# Patient Record
Sex: FEMALE | Race: WHITE | NOT HISPANIC OR LATINO | Employment: OTHER | URBAN - METROPOLITAN AREA
[De-identification: names, ages, dates, MRNs, and addresses within clinical notes are randomized per-mention and may not be internally consistent; named-entity substitution may affect disease eponyms.]

---

## 2017-10-17 NOTE — PATIENT DISCUSSION
Recommended pres free artificial tears to use q2h while awake.  Durezol bid for 5 days, QD for 5 days.

## 2018-09-02 NOTE — PATIENT DISCUSSION
MACULAR DRUSEN, OU:  PRESCRIBE AREDS 2 VITAMINS AND INCREASE UV PROTECTION. STRESS GOOD DIET AND NO SMOKING. RETURN FOR FOLLOW-UP AS SCHEDULED. no

## 2019-09-13 NOTE — PATIENT DISCUSSION
Discussed the use of warm compresses BID with focal lid massage.  Add Maxitrol channing QHS OS into cul de sac.  ed erythema will SLOWLY resolve.  IF NI at a week see KK for injection vs I/D.

## 2019-11-20 NOTE — PATIENT DISCUSSION
Anatomically Narrow Angle Counseling: I have explained to the patient at length regarding the diagnosis of narrow angles and the pathophysiology. I have discussed the treatment option of  a Yag laser iridotomy if needed. I have reviewed the option of glaucoma drops with the patient. Patient instructed to return for follow-up as scheduled.

## 2019-11-20 NOTE — PATIENT DISCUSSION
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 GONIO TO BE DONE AT NEXT ANNUAL TO DOCUMENT ANGLES OU. CONTINUE TO FOLLOW YEARLY.

## 2020-12-04 ENCOUNTER — NEW PATIENT (OUTPATIENT)
Dept: URBAN - METROPOLITAN AREA CLINIC 26 | Facility: CLINIC | Age: 80
End: 2020-12-04

## 2020-12-04 VITALS
HEIGHT: 65 IN | DIASTOLIC BLOOD PRESSURE: 70 MMHG | BODY MASS INDEX: 26.66 KG/M2 | WEIGHT: 160 LBS | HEART RATE: 46 BPM | SYSTOLIC BLOOD PRESSURE: 113 MMHG

## 2020-12-04 DIAGNOSIS — H43.393: ICD-10-CM

## 2020-12-04 DIAGNOSIS — H35.361: ICD-10-CM

## 2020-12-04 DIAGNOSIS — H43.822: ICD-10-CM

## 2020-12-04 DIAGNOSIS — H43.392: ICD-10-CM

## 2020-12-04 DIAGNOSIS — H35.372: ICD-10-CM

## 2020-12-04 DIAGNOSIS — H35.342: ICD-10-CM

## 2020-12-04 PROCEDURE — 99204 OFFICE O/P NEW MOD 45 MIN: CPT

## 2020-12-04 PROCEDURE — 92250 FUNDUS PHOTOGRAPHY W/I&R: CPT

## 2020-12-04 ASSESSMENT — VISUAL ACUITY
OD_SC: 20/50-2
OS_CC: 20/60-2
OS_SC: 20/200
OD_CC: 20/25+1

## 2020-12-04 ASSESSMENT — TONOMETRY
OD_IOP_MMHG: 11
OS_IOP_MMHG: 10

## 2021-01-12 ENCOUNTER — FOLLOW UP (OUTPATIENT)
Dept: URBAN - METROPOLITAN AREA CLINIC 26 | Facility: CLINIC | Age: 81
End: 2021-01-12

## 2021-01-12 VITALS — HEIGHT: 65 IN | WEIGHT: 160 LBS | BODY MASS INDEX: 26.66 KG/M2

## 2021-01-12 DIAGNOSIS — H35.372: ICD-10-CM

## 2021-01-12 DIAGNOSIS — H43.393: ICD-10-CM

## 2021-01-12 DIAGNOSIS — H35.361: ICD-10-CM

## 2021-01-12 DIAGNOSIS — H43.392: ICD-10-CM

## 2021-01-12 DIAGNOSIS — H35.342: ICD-10-CM

## 2021-01-12 DIAGNOSIS — H43.822: ICD-10-CM

## 2021-01-12 PROCEDURE — 92014 COMPRE OPH EXAM EST PT 1/>: CPT

## 2021-01-12 PROCEDURE — 92250 FUNDUS PHOTOGRAPHY W/I&R: CPT

## 2021-01-12 RX ORDER — KETOROLAC TROMETHAMINE 5 MG/ML
1 SOLUTION OPHTHALMIC
Start: 2021-01-12

## 2021-01-12 ASSESSMENT — VISUAL ACUITY
OS_SC: 20/200-2
OD_CC: 20/25+2
OS_CC: 20/400-1

## 2021-01-12 ASSESSMENT — TONOMETRY
OS_IOP_MMHG: 13
OD_IOP_MMHG: 11

## 2021-01-28 ENCOUNTER — SURGERY/PROCEDURE (OUTPATIENT)
Dept: URBAN - METROPOLITAN AREA SURGERY 10 | Facility: SURGERY | Age: 81
End: 2021-01-28

## 2021-01-28 DIAGNOSIS — H35.342: ICD-10-CM

## 2021-01-28 PROCEDURE — 67042 VIT FOR MACULAR HOLE: CPT

## 2021-01-29 ENCOUNTER — POST OP-DILATION (OUTPATIENT)
Dept: URBAN - METROPOLITAN AREA CLINIC 26 | Facility: CLINIC | Age: 81
End: 2021-01-29

## 2021-01-29 DIAGNOSIS — H35.342: ICD-10-CM

## 2021-01-29 DIAGNOSIS — Z98.890: ICD-10-CM

## 2021-01-29 DIAGNOSIS — H35.372: ICD-10-CM

## 2021-01-29 PROCEDURE — 99024 POSTOP FOLLOW-UP VISIT: CPT

## 2021-01-29 RX ORDER — CYCLOPENTOLATE HYDROCHLORIDE 10 MG/ML: 1 SOLUTION/ DROPS OPHTHALMIC

## 2021-01-29 ASSESSMENT — VISUAL ACUITY
OD_SC: 20/60-2
OD_PH: 20/25+2
OS_SC: CF 3FT

## 2021-01-29 ASSESSMENT — TONOMETRY
OD_IOP_MMHG: 14
OS_IOP_MMHG: 12

## 2021-02-12 ENCOUNTER — POST OP-DILATION (OUTPATIENT)
Dept: URBAN - METROPOLITAN AREA CLINIC 26 | Facility: CLINIC | Age: 81
End: 2021-02-12

## 2021-02-12 DIAGNOSIS — Z98.890: ICD-10-CM

## 2021-02-12 DIAGNOSIS — H35.342: ICD-10-CM

## 2021-02-12 PROCEDURE — 99024 POSTOP FOLLOW-UP VISIT: CPT

## 2021-02-12 ASSESSMENT — TONOMETRY
OD_IOP_MMHG: 14
OS_IOP_MMHG: 14

## 2021-02-12 ASSESSMENT — VISUAL ACUITY
OD_CC: 20/20
OS_CC: CF 3FT

## 2021-03-12 ENCOUNTER — POST OP-DILATION (OUTPATIENT)
Dept: URBAN - METROPOLITAN AREA CLINIC 26 | Facility: CLINIC | Age: 81
End: 2021-03-12

## 2021-03-12 DIAGNOSIS — H35.342: ICD-10-CM

## 2021-03-12 DIAGNOSIS — Z98.890: ICD-10-CM

## 2021-03-12 DIAGNOSIS — H43.822: ICD-10-CM

## 2021-03-12 DIAGNOSIS — H35.372: ICD-10-CM

## 2021-03-12 PROCEDURE — 99024 POSTOP FOLLOW-UP VISIT: CPT

## 2021-03-12 PROCEDURE — 92250 FUNDUS PHOTOGRAPHY W/I&R: CPT

## 2021-03-12 ASSESSMENT — TONOMETRY
OD_IOP_MMHG: 11
OS_IOP_MMHG: 12

## 2021-03-12 ASSESSMENT — VISUAL ACUITY
OD_CC: 20/25+2
OS_CC: 20/200+1
OS_PH: 20/80+2

## 2021-04-26 NOTE — PATIENT DISCUSSION
Anatomically Narrow Angle Glaucoma Counseling: I have explained to the patient at length regarding the diagnosis of narrow angles and the pathophysiology. I have discussed the treatment option of  a Yag laser iridotomy if needed. I have reviewed the option of glaucoma drops with the patient. Patient instructed to return for follow-up as scheduled.

## 2021-04-26 NOTE — PATIENT DISCUSSION
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 GONIO DONE TO DOCUMENT ANGLES OU. CONTINUE TO FOLLOW YEARLY.

## 2021-05-21 ENCOUNTER — FOLLOW UP (OUTPATIENT)
Dept: URBAN - METROPOLITAN AREA CLINIC 26 | Facility: CLINIC | Age: 81
End: 2021-05-21

## 2021-05-21 DIAGNOSIS — H35.361: ICD-10-CM

## 2021-05-21 DIAGNOSIS — H35.372: ICD-10-CM

## 2021-05-21 DIAGNOSIS — H35.342: ICD-10-CM

## 2021-05-21 DIAGNOSIS — H43.391: ICD-10-CM

## 2021-05-21 DIAGNOSIS — H43.822: ICD-10-CM

## 2021-05-21 PROCEDURE — 92250 FUNDUS PHOTOGRAPHY W/I&R: CPT

## 2021-05-21 PROCEDURE — 92014 COMPRE OPH EXAM EST PT 1/>: CPT

## 2021-05-21 ASSESSMENT — VISUAL ACUITY
OS_CC: 20/400
OD_CC: 20/25-2
OS_PH: 20/50-2

## 2021-05-21 ASSESSMENT — TONOMETRY
OD_IOP_MMHG: 14
OS_IOP_MMHG: 13

## 2021-09-29 NOTE — PATIENT DISCUSSION
Recommend Bilateral upper lid blepharoplasty. Medicare (discussed risks and benefits of sx. ..). If pt elects BLL bleph, BUL fat will be removed at the same time.

## 2021-09-29 NOTE — PATIENT DISCUSSION
Do not recommend filler to this area as it will make lower face look more square. And pt DOES NOT want filler.

## 2021-09-29 NOTE — PATIENT DISCUSSION
Recommend Deep plane Mini lower lift, follow old incisions, SMAS to cheeks(discussed risks and benefits of sx. .. ).

## 2021-11-19 ENCOUNTER — FOLLOW UP (OUTPATIENT)
Dept: URBAN - METROPOLITAN AREA CLINIC 26 | Facility: CLINIC | Age: 81
End: 2021-11-19

## 2021-11-19 VITALS — HEIGHT: 66 IN | WEIGHT: 145 LBS | BODY MASS INDEX: 23.3 KG/M2

## 2021-11-19 DIAGNOSIS — H43.822: ICD-10-CM

## 2021-11-19 DIAGNOSIS — H43.391: ICD-10-CM

## 2021-11-19 DIAGNOSIS — H35.342: ICD-10-CM

## 2021-11-19 DIAGNOSIS — H35.361: ICD-10-CM

## 2021-11-19 DIAGNOSIS — H35.372: ICD-10-CM

## 2021-11-19 DIAGNOSIS — H04.123: ICD-10-CM

## 2021-11-19 PROCEDURE — 92014 COMPRE OPH EXAM EST PT 1/>: CPT

## 2021-11-19 PROCEDURE — 92134 CPTRZ OPH DX IMG PST SGM RTA: CPT

## 2021-11-19 RX ORDER — ERYTHROMYCIN 5 MG/G: OINTMENT OPHTHALMIC EVERY EVENING

## 2021-11-19 ASSESSMENT — VISUAL ACUITY
OS_SC: 20/40-2
OD_SC: 20/20
OS_PH: 20/25-1

## 2021-11-19 ASSESSMENT — TONOMETRY
OD_IOP_MMHG: 12
OS_IOP_MMHG: 11

## 2022-04-13 ENCOUNTER — TELEPHONE ENCOUNTER (OUTPATIENT)
Dept: URBAN - METROPOLITAN AREA CLINIC 9 | Facility: CLINIC | Age: 82
End: 2022-04-13

## 2022-07-30 ENCOUNTER — TELEPHONE ENCOUNTER (OUTPATIENT)
Age: 82
End: 2022-07-30

## 2022-07-30 RX ORDER — DEXLANSOPRAZOLE 60 MG/1
1 CAPSULE, DELAYED RELEASE ORAL DAILY
Qty: 0 | Refills: 5 | OUTPATIENT
Start: 2016-03-22 | End: 2017-07-31

## 2022-07-30 RX ORDER — DEXLANSOPRAZOLE 60 MG/1
1 CAPSULE, DELAYED RELEASE ORAL
Qty: 0 | Refills: 2 | OUTPATIENT
Start: 2013-12-02 | End: 2014-01-01

## 2022-07-31 ENCOUNTER — TELEPHONE ENCOUNTER (OUTPATIENT)
Age: 82
End: 2022-07-31

## 2022-07-31 RX ORDER — DEXLANSOPRAZOLE 60 MG/1
1 CAPSULE, DELAYED RELEASE ORAL
Qty: 0 | Refills: 2 | Status: ACTIVE | COMMUNITY
Start: 2013-12-02

## 2022-07-31 RX ORDER — DEXLANSOPRAZOLE 60 MG/1
1 CAPSULE, DELAYED RELEASE ORAL DAILY
Qty: 0 | Refills: 5 | Status: ACTIVE | COMMUNITY
Start: 2016-03-22

## 2022-07-31 RX ORDER — DEXLANSOPRAZOLE 60 MG/1
1 CAPSULE, DELAYED RELEASE ORAL DAILY
Qty: 0 | Refills: 5 | Status: ACTIVE | COMMUNITY
Start: 2013-11-25

## 2022-11-18 ENCOUNTER — FOLLOW UP (OUTPATIENT)
Dept: URBAN - METROPOLITAN AREA CLINIC 26 | Facility: CLINIC | Age: 82
End: 2022-11-18

## 2022-11-18 VITALS — BODY MASS INDEX: 22.5 KG/M2 | WEIGHT: 140 LBS | HEIGHT: 66 IN

## 2022-11-18 DIAGNOSIS — H35.361: ICD-10-CM

## 2022-11-18 DIAGNOSIS — H43.822: ICD-10-CM

## 2022-11-18 DIAGNOSIS — H43.391: ICD-10-CM

## 2022-11-18 DIAGNOSIS — H25.11: ICD-10-CM

## 2022-11-18 DIAGNOSIS — H35.372: ICD-10-CM

## 2022-11-18 DIAGNOSIS — H35.343: ICD-10-CM

## 2022-11-18 DIAGNOSIS — H04.123: ICD-10-CM

## 2022-11-18 PROCEDURE — 92014 COMPRE OPH EXAM EST PT 1/>: CPT

## 2022-11-18 PROCEDURE — 92134 CPTRZ OPH DX IMG PST SGM RTA: CPT

## 2022-11-18 PROCEDURE — 92250 FUNDUS PHOTOGRAPHY W/I&R: CPT

## 2022-11-18 ASSESSMENT — TONOMETRY
OS_IOP_MMHG: 10
OD_IOP_MMHG: 14

## 2022-11-18 ASSESSMENT — VISUAL ACUITY
OS_CC: 20/30
OD_CC: 20/30-2

## 2023-11-09 ENCOUNTER — FOLLOW UP (OUTPATIENT)
Dept: URBAN - METROPOLITAN AREA CLINIC 26 | Facility: CLINIC | Age: 83
End: 2023-11-09

## 2023-11-09 DIAGNOSIS — H35.372: ICD-10-CM

## 2023-11-09 DIAGNOSIS — Z96.1: ICD-10-CM

## 2023-11-09 DIAGNOSIS — H35.361: ICD-10-CM

## 2023-11-09 DIAGNOSIS — H35.343: ICD-10-CM

## 2023-11-09 DIAGNOSIS — H43.822: ICD-10-CM

## 2023-11-09 DIAGNOSIS — H04.123: ICD-10-CM

## 2023-11-09 DIAGNOSIS — H43.391: ICD-10-CM

## 2023-11-09 PROCEDURE — 92014 COMPRE OPH EXAM EST PT 1/>: CPT

## 2023-11-09 PROCEDURE — 92134 CPTRZ OPH DX IMG PST SGM RTA: CPT

## 2023-11-09 ASSESSMENT — VISUAL ACUITY
OS_SC: 20/20
OD_SC: 20/20-1

## 2023-11-09 ASSESSMENT — TONOMETRY
OS_IOP_MMHG: 12
OD_IOP_MMHG: 11

## 2024-03-28 ENCOUNTER — EMERGENCY VISIT (OUTPATIENT)
Dept: URBAN - METROPOLITAN AREA CLINIC 26 | Facility: CLINIC | Age: 84
End: 2024-03-28

## 2024-03-28 VITALS — WEIGHT: 145 LBS | BODY MASS INDEX: 24.16 KG/M2 | HEIGHT: 65 IN

## 2024-03-28 DIAGNOSIS — H35.372: ICD-10-CM

## 2024-03-28 DIAGNOSIS — Z96.1: ICD-10-CM

## 2024-03-28 DIAGNOSIS — H35.361: ICD-10-CM

## 2024-03-28 DIAGNOSIS — H43.822: ICD-10-CM

## 2024-03-28 DIAGNOSIS — H04.123: ICD-10-CM

## 2024-03-28 DIAGNOSIS — H35.343: ICD-10-CM

## 2024-03-28 DIAGNOSIS — H43.391: ICD-10-CM

## 2024-03-28 PROCEDURE — 92134 CPTRZ OPH DX IMG PST SGM RTA: CPT

## 2024-03-28 PROCEDURE — 92014 COMPRE OPH EXAM EST PT 1/>: CPT

## 2024-03-28 RX ORDER — LIFITEGRAST 50 MG/ML: 1 SOLUTION/ DROPS OPHTHALMIC TWICE A DAY

## 2024-03-28 ASSESSMENT — TONOMETRY
OD_IOP_MMHG: 8
OS_IOP_MMHG: 8

## 2024-03-28 ASSESSMENT — VISUAL ACUITY
OS_CC: 20/200-1
OD_CC: 20/20-1

## 2024-07-15 ENCOUNTER — EMERGENCY VISIT (OUTPATIENT)
Dept: URBAN - METROPOLITAN AREA CLINIC 26 | Facility: CLINIC | Age: 84
End: 2024-07-15

## 2024-07-15 DIAGNOSIS — H35.361: ICD-10-CM

## 2024-07-15 DIAGNOSIS — H01.003: ICD-10-CM

## 2024-07-15 DIAGNOSIS — H35.372: ICD-10-CM

## 2024-07-15 DIAGNOSIS — H04.123: ICD-10-CM

## 2024-07-15 DIAGNOSIS — H43.822: ICD-10-CM

## 2024-07-15 DIAGNOSIS — H35.343: ICD-10-CM

## 2024-07-15 DIAGNOSIS — H43.811: ICD-10-CM

## 2024-07-15 DIAGNOSIS — H01.006: ICD-10-CM

## 2024-07-15 PROCEDURE — 92250 FUNDUS PHOTOGRAPHY W/I&R: CPT | Mod: 59

## 2024-07-15 PROCEDURE — 92134 CPTRZ OPH DX IMG PST SGM RTA: CPT

## 2024-07-15 PROCEDURE — 92014 COMPRE OPH EXAM EST PT 1/>: CPT

## 2024-07-15 ASSESSMENT — VISUAL ACUITY
OD_CC: 20/20-2
OS_SC: 20/30-2
OS_CC: 20/200-2
OS_PH: 20/25

## 2024-07-15 ASSESSMENT — TONOMETRY
OD_IOP_MMHG: 10
OS_IOP_MMHG: 09

## 2025-07-14 ENCOUNTER — COMPREHENSIVE EXAM (OUTPATIENT)
Age: 85
End: 2025-07-14

## 2025-07-14 DIAGNOSIS — H35.341: ICD-10-CM

## 2025-07-14 DIAGNOSIS — H43.811: ICD-10-CM

## 2025-07-14 DIAGNOSIS — H01.006: ICD-10-CM

## 2025-07-14 DIAGNOSIS — H04.123: ICD-10-CM

## 2025-07-14 DIAGNOSIS — H43.822: ICD-10-CM

## 2025-07-14 DIAGNOSIS — H01.003: ICD-10-CM

## 2025-07-14 DIAGNOSIS — H35.361: ICD-10-CM

## 2025-07-14 DIAGNOSIS — H35.342: ICD-10-CM

## 2025-07-14 DIAGNOSIS — H35.372: ICD-10-CM

## 2025-07-14 PROCEDURE — 92250 FUNDUS PHOTOGRAPHY W/I&R: CPT | Mod: 59

## 2025-07-14 PROCEDURE — 92134 CPTRZ OPH DX IMG PST SGM RTA: CPT

## 2025-07-14 PROCEDURE — 92014 COMPRE OPH EXAM EST PT 1/>: CPT
